# Patient Record
Sex: FEMALE | Race: BLACK OR AFRICAN AMERICAN | Employment: UNEMPLOYED | URBAN - METROPOLITAN AREA
[De-identification: names, ages, dates, MRNs, and addresses within clinical notes are randomized per-mention and may not be internally consistent; named-entity substitution may affect disease eponyms.]

---

## 2021-04-08 ENCOUNTER — OFFICE VISIT (OUTPATIENT)
Dept: ORTHOPEDIC SURGERY | Age: 36
End: 2021-04-08
Payer: MEDICAID

## 2021-04-08 VITALS — HEIGHT: 64 IN | WEIGHT: 238.6 LBS | OXYGEN SATURATION: 100 % | BODY MASS INDEX: 40.74 KG/M2 | HEART RATE: 74 BPM

## 2021-04-08 DIAGNOSIS — M25.562 MECHANICAL PAIN OF LEFT KNEE: ICD-10-CM

## 2021-04-08 DIAGNOSIS — M25.562 ACUTE PAIN OF LEFT KNEE: Primary | ICD-10-CM

## 2021-04-08 PROCEDURE — 99204 OFFICE O/P NEW MOD 45 MIN: CPT | Performed by: ORTHOPAEDIC SURGERY

## 2021-04-08 PROCEDURE — 73564 X-RAY EXAM KNEE 4 OR MORE: CPT | Performed by: ORTHOPAEDIC SURGERY

## 2021-04-08 RX ORDER — IBUPROFEN 200 MG
220 TABLET ORAL AS NEEDED
COMMUNITY

## 2021-04-08 RX ORDER — ACETAMINOPHEN 325 MG/1
500 TABLET ORAL
COMMUNITY

## 2021-04-08 NOTE — PROGRESS NOTES
Patient: Blas Gao                MRN: [id number]       SSN: [id number]  YOB: 1985        AGE: 28 y.o. SEX: female  Body mass index is 40.96 kg/m². PCP: UNKNOWN  04/08/21    HISTORY:  Ms. Kenyon Linn presents today with her brother complaining of left knee pain. It has been ongoing for about a month. She has tried antiinflammatories, she has tried some exercises, she previously had seen a therapist, to no avail. She is getting mechanical symptoms, catching, locking, giving way, but also having problems with kneeling, especially when praying, but the knee will swell medially. Night pain is not a major feature. PHYSICAL EXAMINATION:  Examination today with female assistant present. She is very pleasant. She does walk with a mildly antalgic gait owing to the left knee. The hips are noncontributory. Both feet warm and well perfused. No cyanosis, peripheral edema or clubbing. She does have a mild to moderate effusion, but not severely so. The MCL is not tender. She is directly tender over the medial joint line with a positive Vinnie's test with click and discomfort. 1+ ACL. Quads are intact. Mellody Reynold' sign is negative. PCL exam deferred. RADIOGRAPHS:  Review of x-rays today on 04/08/21, AP, tunnel, lateral and skyline of left knee show mild arthritis involving the knee in the medial compartment. IMPRESSION:  My overall impression is mechanical symptoms after mechanical injury to the knee with catching, locking, giving way, non responsive to exercises or to antiinflammatories. PLAN:  I would therefore recommend MRI evaluation for the knee itself. There was a discussion regarding surgery. If the MRI does show significant meniscal tear, she will require arthroscopy, otherwise we will treat for arthritis.         REVIEW OF SYSTEMS:      CON: negative  EYE: negative   ENT: negative  RESP: negative  GI:    negative   :  negative  MSK: Positive  A twelve point review of systems was completed, positives noted and all other systems were reviewed and are negative          History reviewed. No pertinent past medical history. No family history on file. Current Outpatient Medications   Medication Sig Dispense Refill    acetaminophen (TylenoL) 325 mg tablet Take 500 mg by mouth every four (4) hours as needed for Pain.  ibuprofen (Motrin IB) 200 mg tablet Take 220 mg by mouth as needed for Pain.      Alex Roach         Not on File    Past Surgical History:   Procedure Laterality Date    HX  SECTION  2020       Social History     Socioeconomic History    Marital status: UNKNOWN     Spouse name: Not on file    Number of children: Not on file    Years of education: Not on file    Highest education level: Not on file   Occupational History    Not on file   Social Needs    Financial resource strain: Not on file    Food insecurity     Worry: Not on file     Inability: Not on file    Transportation needs     Medical: Not on file     Non-medical: Not on file   Tobacco Use    Smoking status: Never Smoker    Smokeless tobacco: Never Used   Substance and Sexual Activity    Alcohol use: Never     Frequency: Never     Binge frequency: Never    Drug use: Never    Sexual activity: Not on file   Lifestyle    Physical activity     Days per week: Not on file     Minutes per session: Not on file    Stress: Not on file   Relationships    Social connections     Talks on phone: Not on file     Gets together: Not on file     Attends Spiritism service: Not on file     Active member of club or organization: Not on file     Attends meetings of clubs or organizations: Not on file     Relationship status: Not on file    Intimate partner violence     Fear of current or ex partner: Not on file     Emotionally abused: Not on file     Physically abused: Not on file     Forced sexual activity: Not on file   Other Topics Concern    Not on file Social History Narrative    Not on file       Visit Vitals  Pulse 74   Ht 5' 4\" (1.626 m)   Wt 108.2 kg (238 lb 9.6 oz)   SpO2 100%   BMI 40.96 kg/m²         PHYSICAL EXAMINATION:  GENERAL: Alert and oriented x3, in no acute distress, well-developed, well-nourished, afebrile. HEART: No JVD. EYES: No scleral icterus   NECK: No significant lymphadenopathy   LUNGS: No respiratory compromise or indrawing  ABDOMEN: Soft, non-tender, non-distended. Electronically signed by:  Mitchell Curtis MD

## 2021-04-15 DIAGNOSIS — M25.562 ACUTE PAIN OF LEFT KNEE: ICD-10-CM

## 2021-04-15 DIAGNOSIS — M25.562 MECHANICAL PAIN OF LEFT KNEE: ICD-10-CM

## 2021-05-21 ENCOUNTER — HOSPITAL ENCOUNTER (OUTPATIENT)
Dept: MRI IMAGING | Age: 36
Discharge: HOME | End: 2021-05-21
Attending: ORTHOPAEDIC SURGERY
Payer: MEDICAID

## 2021-05-21 PROCEDURE — 73721 MRI JNT OF LWR EXTRE W/O DYE: CPT
